# Patient Record
Sex: MALE | Race: WHITE | URBAN - METROPOLITAN AREA
[De-identification: names, ages, dates, MRNs, and addresses within clinical notes are randomized per-mention and may not be internally consistent; named-entity substitution may affect disease eponyms.]

---

## 2020-03-16 ENCOUNTER — OFFICE VISIT (OUTPATIENT)
Dept: URGENT CARE | Facility: URGENT CARE | Age: 60
End: 2020-03-16
Payer: COMMERCIAL

## 2020-03-16 ENCOUNTER — VIRTUAL VISIT (OUTPATIENT)
Dept: FAMILY MEDICINE | Facility: OTHER | Age: 60
End: 2020-03-16

## 2020-03-16 DIAGNOSIS — J11.1 INFLUENZA-LIKE ILLNESS: Primary | ICD-10-CM

## 2020-03-16 PROCEDURE — U0002 COVID-19 LAB TEST NON-CDC: HCPCS | Mod: 90 | Performed by: PREVENTIVE MEDICINE

## 2020-03-16 PROCEDURE — 99213 OFFICE O/P EST LOW 20 MIN: CPT | Performed by: PREVENTIVE MEDICINE

## 2020-03-16 PROCEDURE — 99000 SPECIMEN HANDLING OFFICE-LAB: CPT | Performed by: PREVENTIVE MEDICINE

## 2020-03-16 NOTE — PATIENT INSTRUCTIONS
You are being tested for Corona virus and possibly Influenza and/or RSV.     Please use the information at the end of this document to sign up for [facility] where you can get your results and a message about those results sent to you through the [employer] application. If you do not have mychart we will call you with your results but it may take longer.    Isolate Yourself:    Isolate yourself while traveling.    Do Not allow any visitors within 6 feet.    Do Not go to work or school.    Do Not go to [employer],  centers, shopping, or other public places.    Do Not shake hands.    Avoid close contact with others (hugging, kissing).    Protect Others:    Cover Your Mouth and Nose with a mask, disposable tissue or wash cloth to avoid spreading germs to others.    Wash your hands and face frequently with soap and water    Call Back If: Breathing difficulty develops or you become worse.    For more information about COVID19 and options for caring for yourself at home, please visit the CDC website at https://www.cdc.gov/coronavirus/2019-ncov/about/steps-when-sick.html  For more options for care at [facility], please visit our website at [email or web address]

## 2020-03-16 NOTE — PROGRESS NOTES
Chief Complaint:    COVID-19 evaluation    HPI: Mykel Fabian is an 59 year old male who has been triaged via [facility]care for possible COVID-19 testing.  Patient was not examined in clinic today.      Patient remained in their car during collection process.  Droplet precautions + contact precautions + eye protection were in effect during collection process.  PPE included gown, surgical mask, face shield, and gloves.    Patient Assessment    Patient is stable and in no respiratory distress.  Patient does not appear toxic.    Medical Decision Making:    Patient does meet criteria for COVID testing.     PLAN:    No results found for any visits on 03/16/20.     Covid-19 NP and OP swabs collected.  These will be sent to the [facility] by lab staff.    Patient advised to stay home with mild symptoms and follow home care symptom management.    Instructions Given to Patient    Isolate Yourself:    Isolate yourself while traveling.    Do Not allow any visitors within 6 feet.    Do Not go to work or school.    Do Not go to [employer],  centers, shopping, or other public places.    Do Not shake hands.    Avoid close contact with others (hugging, kissing).    Protect Others:    Cover Your Mouth and Nose with a mask, disposable tissue or wash cloth to avoid spreading germs to others.    Wash your hands and face frequently with soap and water    Fever Medicines:    For fever relief, take acetaminophen or ibuprofen.    Treat fevers above 101  F (38.3  C) to lower fevers and make you more comfortable.     Acetaminophen (e.g., Tylenol): Take 650 mg (two 325 mg pills) by mouth every 4-6 hours as needed of regular strength Tylenol or 1,000 mg (two 500 mg pills) every 8 hours as needed of Extra Strength Tylenol.     Ibuprofen (e.g., Motrin, Advil): Take 400 mg (two 200 mg pills) by mouth every 6 hours as needed.     Acetaminophen is thought to be safer than ibuprofen or naproxen for people over 65 years old. Acetaminophen is in many OTC  and prescription medicines. It might be in more than one medicine that you are taking. You need to be careful and not take an overdose. Before taking any medicine, read all the instructions on the package.    Caution -NSAIDs (e.g., ibuprofen, naproxen): Do not take nonsteroidal anti-inflammatory drugs (NSAIDs) if you have stomach problems, kidney disease, heart failure, or other contraindications to using this type of medicine. Do not take NSAID medicines for over 7 days without consulting your PCP. Do not take NSAID medicines if you are pregnant. Do not take NSAID medicines if you are also taking blood thinners.     Thank you for limiting contact with others, wearing a simple mask to cover your cough, practice good hand hygiene habits and accessing our virtual services where possible to limit the spread of this virus.    For more information about COVID19 and options for caring for yourself at home, please visit the CDC website at https://www.cdc.gov/coronavirus/2019-ncov/about/steps-when-sick.html  For more options for care at [facility], please visit our website at [email or web address]        Thomas Zee MD, MD 03/16/201:54 PM

## 2020-03-20 NOTE — PROGRESS NOTES
"Date: 2020 09:06:58  Clinician: Sergio Willis  Clinician NPI: [id number]  Patient: Bishnu Fabian  Patient : 1960  Patient Address: [facility] [place] [address]  Patient Phone: [phone]  Visit Protocol: URI  Patient Summary:  Bishnu is a 59 year old ( : 1960 ) male who initiated a Visit for COVID-19 (Coronavirus) evaluation and screening. When asked the question \"Please sign me up to receive news, health information and promotions from [employer].\", Bishnu responded \"No\".    Bishnu states his symptoms started suddenly 7-9 days ago.   His symptoms consist of malaise, a headache, enlarged lymph nodes, a sore throat, a cough, and tooth pain.   Symptom details     Cough: Bishnu coughs almost every minute and his cough is more bothersome at night. Phlegm does not come into his throat when he coughs. He does not believe his cough is caused by post-nasal drip.     Sore throat: Bishnu reports having moderate throat pain (4-6 on a 10 point pain scale), does not have exudate on his tonsils, and can swallow liquids. The lymph nodes in his neck are enlarged. A rash has not appeared on the skin since the sore throat started.     Headache: He states the headache is mild (1-3 on a 10 point pain scale).     Tooth pain: The tooth pain is not caused by a cavity, recent dental work, or other mouth problems.      Bishnu denies having ear pain, rhinitis, facial pain or pressure, myalgias, fever, chills, wheezing, and nasal congestion. He also denies having recent facial or sinus surgery in the past 60 days, double sickening (worsening symptoms after initial improvement), and taking antibiotic medication for the symptoms. He is not experiencing dyspnea.   Precipitating events  Bishnu is not sure if he has been exposed to someone with strep throat. He has not recently been exposed to someone with influenza. Bishnu has not been in close contact with any high risk individuals.   Pertinent COVID-19 (Coronavirus) information  Bishnu " has traveled internationally or to the areas where COVID-19 (Coronavirus) is widespread in the last 14 days before the start of his symptoms. Countries or locations traveled as reported by the patient (free text): Anam Goetz has not had close contact with a suspected or laboratory-confirmed COVID-19 patient within 14 days of symptom onset.   Bishnu is not a healthcare worker and does not work in a healthcare facility.   Pertinent medical history  Bishnu does not need a return to work/school note.   Weight: 165 lbs   Bishnu does not smoke or use smokeless tobacco.   Weight: 165 lbs    MEDICATIONS: omeprazole oral, ALLERGIES: NKDA  Clinician Response:  Dear Bishnu,   Based on the information you have provided, it is recommended that you go to one of our designated Coronavirus (Covid-19) testing centers to get a test done from your car.To do this follow these instructions:  You should go to one of our dedicated testing centers as soon as able during the hours below at one of these locations:    Walk-in Care: [facility] at [address], [place] [address]. Hours: M-F 7am - 6pm, Sat-Sun 8am -- 3pm   [facility] at [address] [address]. Hours: Every Day 9am -- 7pm  Walk-in Care: [facility] at [address] [place] [address]. Hours: M-F 7am - 6pm, Sat-Sun 8am -- 3pm  M [address] [address]. Hours: M-F 11am -- 7pm, Sat-Sun 9am-4pm  [facility] ([facility])[address], [place] [address].Hours: M-F 730-5     What to expect:    When you arrive please come park in the parking lot.  Be prepared to present photo ID  Call [phone] (For [facility] location call [phone]) and let them know which clinic you are at, the description of your car and where you are parked. Mention you did an [facility] visit and were sent for testing.  On that phone call you will give them the  information to register your for the visit.  They will add you to the queue to get your test (you will stay in your car the entire time).  You will then be met by a provider who will perform a brief assessment in your car and collect samples to test for coronavirus and possibly influenza or RSV.From now until your test results return, please follow self-isolation practices:Isolate Yourself:     Isolate yourself while traveling.  Do Not allow any visitors within 6 feet.  Do Not go to work or school.  Do Not go to [employer],  centers, shopping, or other public places.  Do Not shake hands.  Avoid close contact with others (hugging, kissing).Protect Others:     Cover Your Mouth and Nose with a mask, disposable tissue or wash cloth to avoid spreading germs to others.  Wash your hands and face frequently with soap and water      Fever Medicines:    For fever relief, take acetaminophen or ibuprofen.  Treat fevers above 101deg F (38.3deg C) to lower fevers and make you more comfortable.   Acetaminophen (e.g., Tylenol): Take 650 mg (two 325 mg pills) by mouth every 4-6 hours as needed of regular strength Tylenol or 1,000 mg (two 500 mg pills) every 8 hours as needed of Extra Strength Tylenol.   Ibuprofen (e.g., Motrin, Advil): Take 400 mg (two 200 mg pills) by mouth every 6 hours as needed.   Acetaminophen is thought to be safer than ibuprofen or naproxen for people over 65 years old. Acetaminophen is in many OTC and prescription medicines. It might be in more than one medicine that you are taking. You need to be careful and not take an overdose. Before taking any medicine, read all the instructions on the package.  Caution -NSAIDs (e.g., ibuprofen, naproxen): Do not take nonsteroidal anti-inflammatory drugs (NSAIDs) if you have stomach problems, kidney disease, heart failure, or other contraindications to using this type of medicine. Do not take NSAID medicines for over 7 days without consulting your PCP. Do not take  NSAID medicines if you are pregnant. Do not take NSAID medicines if you are also taking blood thinners.    Call Back If: Breathing difficulty develops or you become worse.  Thank you for limiting contact with others, wearing a simple mask to cover your cough, practice good hand hygiene habits and accessing our virtual services where possible to limit the spread of this virus.     For more information about COVID19 and options for caring for yourself at home, please visit the CDC website at https://www.cdc.gov/coronavirus/2019-ncov/about/steps-when-sick.html  For more options for care at [facility], please visit our website at [email or web address]    Diagnosis: Cough  Diagnosis ICD: R05

## 2020-03-20 NOTE — PROGRESS NOTES
"Date: 2020 10:09:35  Clinician: Renata Sewell  Clinician NPI: [id number]  Patient: Bishnu Fabian  Patient : 1960  Patient Address: [facility] [place] [address]  Patient Phone: [phone]  Visit Protocol: URI  Patient Summary:  Bishnu is a 59 year old ( : 1960 ) male who initiated a Visit for COVID-19 (Coronavirus) evaluation and screening. When asked the question \"Please sign me up to receive news, health information and promotions. \", Bishnu responded \"No\".    Bishnu states his symptoms started suddenly 10-13 days ago.   His symptoms consist of malaise, a headache, enlarged lymph nodes, a sore throat, and a cough.   Symptom details     Cough: Bishnu coughs almost every minute and his cough is more bothersome at night. Phlegm does not come into his throat when he coughs. He does not believe his cough is caused by post-nasal drip.     Sore throat: Bishnu reports having mild throat pain (1-3 on a 10 point pain scale), does not have exudate on his tonsils, and can swallow liquids. The lymph nodes in his neck are enlarged. A rash has not appeared on the skin since the sore throat started.     Headache: He states the headache is mild (1-3 on a 10 point pain scale).      Bishnu denies having ear pain, rhinitis, facial pain or pressure, myalgias, fever, chills, wheezing, nasal congestion, and teeth pain. He also denies having recent facial or sinus surgery in the past 60 days, double sickening (worsening symptoms after initial improvement), and taking antibiotic medication for the symptoms. He is not experiencing dyspnea.   Precipitating events  Bishnu is not sure if he has been exposed to someone with strep throat. He has not recently been exposed to someone with influenza. Bishnu has not been in close contact with any high risk individuals.   Pertinent COVID-19 (Coronavirus) information  Bishnu has traveled internationally or to the areas where COVID-19 (Coronavirus) is widespread in the last 14 days before " the start of his symptoms. Countries or locations traveled as reported by the patient (free text): [facility]   Bishnu has not had close contact with a suspected or laboratory-confirmed COVID-19 patient within 14 days of symptom onset.   Bishnu is not a healthcare worker and does not work in a healthcare facility.   Pertinent medical history  Bishnu does not need a return to work/school note.   Weight: 165 lbs   Bishnu does not smoke or use smokeless tobacco.   Weight: 165 lbs    MEDICATIONS: omeprazole oral, ALLERGIES: NKDA  Clinician Response:  Dear Bishnu,  Based on the information provided, you have an influenza-like illness. This is an infection that has the same symptoms of the flu, but the specific virus is not known. Lab testing would be required to confirm the flu virus, but this is often not necessary because the treatment will be the same no matter what is causing your symptoms.  Your symptoms should improve gradually over the next week.  Medication information  I am prescribing:     Benzonatate (Tessalon Perles) 100 mg oral capsule. Take 1-2 capsules by mouth 3 times per day as needed for your cough. There are no refills with this prescription.   The CDC recommends treatment for flu only if antiviral medications can be started within 48 hours of the first flu symptoms or if you fall into one of the high-risk groups that are more likely to get flu complications.  Since you do not meet guidelines for treatment with antiviral medications, antiviral treatment is not recommended for you. Treatment focuses on controlling your symptoms.  Unless you are allergic to the over-the-counter medication(s) below, I recommend using:       Acetaminophen (Tylenol or store brand) oral tablet. Take 1-2 tablets by mouth every 4-6 hours to help with the discomfort.      Guaifenesin + dextromethorphan (Robitussin DM, Mucinex DM, or store brand).     Over-the-counter medications do not require a prescription. Ask the pharmacist if you have any  questions.  Self care  The following tips will keep you as comfortable as possible while you recover:     Rest    Drink plenty of water and other liquids    Use throat lozenges    Gargle with warm salt water (1/4 teaspoon of salt per 8 ounce glass of water)    Suck on frozen items such as popsicles or ice cubes    Drink hot tea with lemon and honey    Take a spoonful of honey to reduce your cough     If you have a fever, stay home until your temperature has returned to normal for 24 hours and you feel well enough for daily activities. And of course, wash your hands often to prevent spreading the flu and other illnesses. However, the best way to prevent the flu is to get a flu shot before each flu season.  When to seek care  Please be seen in a clinic or urgent care if new symptoms develop, or symptoms become worse.  Call 911 or go to the emergency room if you feel that your throat is closing off, you suddenly develop a rash, you are unable to swallow fluids, you are drooling, or you are having difficulty breathing.  Additional treatment plan   Based on the information you have provided, you do have symptoms that are consistent with Coronavirus (COVID-19).   The coronavirus causes mild to severe respiratory illness with the most common symptoms including fever, cough and difficulty breathing. Unfortunately, many viruses cause similar symptoms and it can be difficult to distinguish between viruses, especially in mild cases, so we are presuming that anyone with cough or fever has coronavirus at this time.  Coronavirus/COVID-19 has reached the point of community spread in [place], meaning that we are finding the virus in people with no known exposure risk for jelani the virus. Given the increasing commonness of coronavirus in the community we are focusing testing on those people at highest risk of developing significant complications of the disease. This includes people who are over age 60, have Hypertension,  Diabetes, Heart conditions such as heart failure or previous heart attack, end stage renal disease, chronic liver conditions, COPD or Emphysema, Asthma, Interstitial Lung Disease, Cancer, transplant recipients, HIV, individuals on chemotherapy or immunosuppressive medications.  Those such as yourself who are younger and healthy may not be offered testing and should assume are infected with coronavirus. Accordingly, you should self-quarantine for fourteen days. You should call if you find increasing shortness of breath, wheezing or sustained fever above 101.5. If you are significantly short of breath or experience chest pain you should call 911 or report to the nearest emergency department for urgent evaluation.   Isolate Yourself:    Isolate yourself at home.   Do Not allow any visitors  Do Not go to work or school  Do Not go to [employer],  centers, shopping, or other public places.  Do Not shake hands.  Avoid close contact with others (hugging, kissing).Protect Others:     Cover Your Mouth and Nose with a mask, disposable tissue or wash cloth to avoid spreading germs to others.  Wash your hands and face frequently with soap and water.   If you develop significant shortness of breath that prevents you from doing normal activities, please call 911 or proceed to the nearest emergency room and alert them immediately that you have been in self-isolation for possible coronavirus.   For more information about COVID19 and options for caring for yourself at home, please visit the CDC website at https://www.cdc.gov/coronavirus/2019-ncov/about/steps-when-sick.htmlFor more options for care at [facility], please visit our website at [email or web address]     Diagnosis: Cough  Diagnosis ICD: R05  Prescription: benzonatate (Tessalon Perles) 100 mg oral capsule 30 capsule, 5 days supply. Take 1-2 capsules by mouth 3 times per day as needed. Refills: 0, Refill as needed: no, Allow  substitutions: yes  Pharmacy: [id number] - [phone] - [address] [place] [address]

## 2020-03-22 LAB
SARS-COV-2 RNA SPEC QL NAA+PROBE: NOT DETECTED
SPECIMEN SOURCE: NORMAL